# Patient Record
Sex: MALE | Race: WHITE | NOT HISPANIC OR LATINO | Employment: FULL TIME | URBAN - METROPOLITAN AREA
[De-identification: names, ages, dates, MRNs, and addresses within clinical notes are randomized per-mention and may not be internally consistent; named-entity substitution may affect disease eponyms.]

---

## 2019-03-17 ENCOUNTER — APPOINTMENT (OUTPATIENT)
Dept: CT IMAGING | Facility: HOSPITAL | Age: 49
End: 2019-03-17

## 2019-03-17 ENCOUNTER — HOSPITAL ENCOUNTER (EMERGENCY)
Facility: HOSPITAL | Age: 49
Discharge: HOME | End: 2019-03-17
Attending: EMERGENCY MEDICINE | Admitting: EMERGENCY MEDICINE

## 2019-03-17 VITALS
HEIGHT: 74 IN | SYSTOLIC BLOOD PRESSURE: 137 MMHG | BODY MASS INDEX: 21.17 KG/M2 | DIASTOLIC BLOOD PRESSURE: 93 MMHG | HEART RATE: 78 BPM | TEMPERATURE: 98.3 F | RESPIRATION RATE: 20 BRPM | WEIGHT: 165 LBS | OXYGEN SATURATION: 99 %

## 2019-03-17 DIAGNOSIS — N20.1 RIGHT URETERAL STONE: Primary | ICD-10-CM

## 2019-03-17 LAB
ALBUMIN SERPL-MCNC: 4.4 G/DL (ref 3.2–4.8)
ALBUMIN/GLOB SERPL: 2 G/DL (ref 1.5–2.5)
ALP SERPL-CCNC: 84 U/L (ref 25–100)
ALT SERPL W P-5'-P-CCNC: 42 U/L (ref 7–40)
ANION GAP SERPL CALCULATED.3IONS-SCNC: 9 MMOL/L (ref 3–11)
AST SERPL-CCNC: 26 U/L (ref 0–33)
BACTERIA UR QL AUTO: ABNORMAL /HPF
BASOPHILS # BLD AUTO: 0.02 10*3/MM3 (ref 0–0.2)
BASOPHILS NFR BLD AUTO: 0.3 % (ref 0–1)
BILIRUB SERPL-MCNC: 0.3 MG/DL (ref 0.3–1.2)
BILIRUB UR QL STRIP: NEGATIVE
BUN BLD-MCNC: 10 MG/DL (ref 9–23)
BUN/CREAT SERPL: 10.1 (ref 7–25)
CALCIUM SPEC-SCNC: 8.9 MG/DL (ref 8.7–10.4)
CHLORIDE SERPL-SCNC: 110 MMOL/L (ref 99–109)
CLARITY UR: ABNORMAL
CO2 SERPL-SCNC: 24 MMOL/L (ref 20–31)
COLOR UR: YELLOW
CREAT BLD-MCNC: 0.99 MG/DL (ref 0.6–1.3)
DEPRECATED RDW RBC AUTO: 40.8 FL (ref 37–54)
EOSINOPHIL # BLD AUTO: 0.17 10*3/MM3 (ref 0–0.3)
EOSINOPHIL NFR BLD AUTO: 2.3 % (ref 0–3)
ERYTHROCYTE [DISTWIDTH] IN BLOOD BY AUTOMATED COUNT: 13.2 % (ref 11.3–14.5)
GFR SERPL CREATININE-BSD FRML MDRD: 81 ML/MIN/1.73
GLOBULIN UR ELPH-MCNC: 2.2 GM/DL
GLUCOSE BLD-MCNC: 110 MG/DL (ref 70–100)
GLUCOSE UR STRIP-MCNC: NEGATIVE MG/DL
HCT VFR BLD AUTO: 44.5 % (ref 38.9–50.9)
HGB BLD-MCNC: 15.3 G/DL (ref 13.1–17.5)
HGB UR QL STRIP.AUTO: ABNORMAL
HOLD SPECIMEN: NORMAL
HOLD SPECIMEN: NORMAL
HYALINE CASTS UR QL AUTO: ABNORMAL /LPF
IMM GRANULOCYTES # BLD AUTO: 0.02 10*3/MM3 (ref 0–0.05)
IMM GRANULOCYTES NFR BLD AUTO: 0.3 % (ref 0–0.6)
KETONES UR QL STRIP: ABNORMAL
LEUKOCYTE ESTERASE UR QL STRIP.AUTO: NEGATIVE
LIPASE SERPL-CCNC: 61 U/L (ref 6–51)
LYMPHOCYTES # BLD AUTO: 2.6 10*3/MM3 (ref 0.6–4.8)
LYMPHOCYTES NFR BLD AUTO: 34.5 % (ref 24–44)
MCH RBC QN AUTO: 29.4 PG (ref 27–31)
MCHC RBC AUTO-ENTMCNC: 34.4 G/DL (ref 32–36)
MCV RBC AUTO: 85.6 FL (ref 80–99)
MONOCYTES # BLD AUTO: 0.51 10*3/MM3 (ref 0–1)
MONOCYTES NFR BLD AUTO: 6.8 % (ref 0–12)
NEUTROPHILS # BLD AUTO: 4.24 10*3/MM3 (ref 1.5–8.3)
NEUTROPHILS NFR BLD AUTO: 56.1 % (ref 41–71)
NITRITE UR QL STRIP: NEGATIVE
PH UR STRIP.AUTO: <=5 [PH] (ref 5–8)
PLATELET # BLD AUTO: 303 10*3/MM3 (ref 150–450)
PMV BLD AUTO: 10 FL (ref 6–12)
POTASSIUM BLD-SCNC: 3.7 MMOL/L (ref 3.5–5.5)
PROT SERPL-MCNC: 6.6 G/DL (ref 5.7–8.2)
PROT UR QL STRIP: NEGATIVE
RBC # BLD AUTO: 5.2 10*6/MM3 (ref 4.2–5.76)
RBC # UR: ABNORMAL /HPF
REF LAB TEST METHOD: ABNORMAL
SODIUM BLD-SCNC: 143 MMOL/L (ref 132–146)
SP GR UR STRIP: 1.02 (ref 1–1.03)
SQUAMOUS #/AREA URNS HPF: ABNORMAL /HPF
UROBILINOGEN UR QL STRIP: ABNORMAL
WBC NRBC COR # BLD: 7.54 10*3/MM3 (ref 3.5–10.8)
WBC UR QL AUTO: ABNORMAL /HPF
WHOLE BLOOD HOLD SPECIMEN: NORMAL
WHOLE BLOOD HOLD SPECIMEN: NORMAL

## 2019-03-17 PROCEDURE — 80053 COMPREHEN METABOLIC PANEL: CPT

## 2019-03-17 PROCEDURE — 74176 CT ABD & PELVIS W/O CONTRAST: CPT

## 2019-03-17 PROCEDURE — 25010000002 ONDANSETRON PER 1 MG: Performed by: EMERGENCY MEDICINE

## 2019-03-17 PROCEDURE — 83690 ASSAY OF LIPASE: CPT

## 2019-03-17 PROCEDURE — 25010000002 MORPHINE PER 10 MG: Performed by: EMERGENCY MEDICINE

## 2019-03-17 PROCEDURE — 96374 THER/PROPH/DIAG INJ IV PUSH: CPT

## 2019-03-17 PROCEDURE — 99284 EMERGENCY DEPT VISIT MOD MDM: CPT

## 2019-03-17 PROCEDURE — 85025 COMPLETE CBC W/AUTO DIFF WBC: CPT

## 2019-03-17 PROCEDURE — 96375 TX/PRO/DX INJ NEW DRUG ADDON: CPT

## 2019-03-17 PROCEDURE — 81001 URINALYSIS AUTO W/SCOPE: CPT

## 2019-03-17 RX ORDER — ONDANSETRON 2 MG/ML
4 INJECTION INTRAMUSCULAR; INTRAVENOUS ONCE
Status: COMPLETED | OUTPATIENT
Start: 2019-03-17 | End: 2019-03-17

## 2019-03-17 RX ORDER — DICLOFENAC SODIUM 75 MG/1
75 TABLET, DELAYED RELEASE ORAL 2 TIMES DAILY
Qty: 14 TABLET | Refills: 0 | Status: SHIPPED | OUTPATIENT
Start: 2019-03-17

## 2019-03-17 RX ORDER — MORPHINE SULFATE 4 MG/ML
4 INJECTION, SOLUTION INTRAMUSCULAR; INTRAVENOUS ONCE
Status: COMPLETED | OUTPATIENT
Start: 2019-03-17 | End: 2019-03-17

## 2019-03-17 RX ORDER — OXYCODONE AND ACETAMINOPHEN 7.5; 325 MG/1; MG/1
1 TABLET ORAL EVERY 6 HOURS PRN
Qty: 15 TABLET | Refills: 0 | Status: SHIPPED | OUTPATIENT
Start: 2019-03-17

## 2019-03-17 RX ORDER — OMEPRAZOLE 20 MG/1
20 CAPSULE, DELAYED RELEASE ORAL DAILY
COMMUNITY

## 2019-03-17 RX ORDER — SODIUM CHLORIDE 0.9 % (FLUSH) 0.9 %
10 SYRINGE (ML) INJECTION AS NEEDED
Status: DISCONTINUED | OUTPATIENT
Start: 2019-03-17 | End: 2019-03-17 | Stop reason: HOSPADM

## 2019-03-17 RX ORDER — ONDANSETRON 4 MG/1
4 TABLET, ORALLY DISINTEGRATING ORAL EVERY 6 HOURS PRN
Qty: 15 TABLET | Refills: 0 | Status: SHIPPED | OUTPATIENT
Start: 2019-03-17

## 2019-03-17 RX ADMIN — MORPHINE SULFATE 4 MG: 4 INJECTION INTRAVENOUS at 18:52

## 2019-03-17 RX ADMIN — SODIUM CHLORIDE 500 ML: 9 INJECTION, SOLUTION INTRAVENOUS at 20:05

## 2019-03-17 RX ADMIN — ONDANSETRON 4 MG: 2 INJECTION INTRAMUSCULAR; INTRAVENOUS at 18:52

## 2019-03-17 NOTE — ED PROVIDER NOTES
Subjective   Ms. Olivier Childress is a 48 y.o. male who presents to the ED with complaints of abdominal pain onset approximately one hour. Patient reports approximately one hour ago he had sudden onset of RLQ abdominal pain radiating to right flank with accompanied nausea and one vomiting episode. He reported to a [facility] following onset and during visit he was given Toradol IM and instructed to report to ED for further evaluation. Patient states the [facility] provider associated his symptoms with a kidney stone and he states his symptoms are somewhat similar to previous kidney stone episode. He also complains of one episode of acid reflux yesterday and difficulty urinating onset today. He denies dysuria, fever, diarrhea, chills, and any other acute symptoms at this time. Past medical history significant for GERD, traumatic brain injury, joint replacement, cholecystectomy, mandible surgery, back surgery.         History provided by:  Patient  Abdominal Pain   Pain location:  RLQ  Pain radiates to:  R flank  Pain severity:  Moderate  Onset quality:  Sudden  Duration:  1 hour  Timing:  Constant  Progression:  Unchanged  Chronicity:  New  Relieved by:  Nothing  Worsened by:  Nothing  Ineffective treatments: Toradol IM.  Associated symptoms: nausea and vomiting    Associated symptoms: no chills, no diarrhea, no dysuria and no fever        Review of Systems   Constitutional: Negative for chills and fever.   Gastrointestinal: Positive for abdominal pain, nausea and vomiting. Negative for diarrhea.   Genitourinary: Positive for difficulty urinating and flank pain (right). Negative for dysuria.   All other systems reviewed and are negative.      Past Medical History:   Diagnosis Date   • GERD (gastroesophageal reflux disease)    • Traumatic brain injury (CMS/HCC)        No Known Allergies    Past Surgical History:   Procedure Laterality Date   • BACK SURGERY     • CHOLECYSTECTOMY     • JOINT REPLACEMENT     • MANDIBLE SURGERY          History reviewed. No pertinent family history.    Social History     Socioeconomic History   • Marital status:      Spouse name: Not on file   • Number of children: Not on file   • Years of education: Not on file   • Highest education level: Not on file   Tobacco Use   • Smoking status: Never Smoker   Substance and Sexual Activity   • Alcohol use: No     Frequency: Never   • Drug use: No   • Sexual activity: Defer         Objective   Physical Exam   Constitutional: He is oriented to person, place, and time. He appears well-developed and well-nourished. No distress.   HENT:   Head: Normocephalic and atraumatic.   Right Ear: External ear normal.   Left Ear: External ear normal.   Nose: Nose normal.   Eyes: Conjunctivae are normal. No scleral icterus.   Neck: Normal range of motion. Neck supple.   Cardiovascular: Normal rate, regular rhythm and normal heart sounds. Exam reveals no gallop and no friction rub.   No murmur heard.  Pulmonary/Chest: Effort normal and breath sounds normal. No respiratory distress. He has no wheezes. He has no rales. He exhibits no tenderness.   Abdominal: Soft. Bowel sounds are normal. There is no tenderness. There is no rebound and no guarding.   Musculoskeletal: Normal range of motion.   Neurological: He is alert and oriented to person, place, and time.   Skin: Skin is warm and dry. He is not diaphoretic.   Psychiatric: He has a normal mood and affect. His behavior is normal.   Nursing note and vitals reviewed.      Procedures         ED Course     Symptoms resolved with meds.  Labs and UA benign.  CT shows 3 mm R ureteral stone.  Patient stable on serial rechecks.  Discussed findings, concerns, plan of care, expected course, reasons to return and followup.                  MDM  Number of Diagnoses or Management Options  Right ureteral stone:      Amount and/or Complexity of Data Reviewed  Clinical lab tests: reviewed and ordered  Tests in the radiology section of CPT®:  reviewed and ordered  Independent visualization of images, tracings, or specimens: yes        Final diagnoses:   Right ureteral stone       Documentation assistance provided by scribRossana Alvarez.  Information recorded by the scribe was done at my direction and has been verified and validated by me.     Elieser Alvarez  03/17/19 [id number]       Balbir Hardin MD  03/18/19 [id number]

## 2019-10-15 ENCOUNTER — APPOINTMENT (OUTPATIENT)
Dept: CT IMAGING | Facility: HOSPITAL | Age: 49
End: 2019-10-15

## 2019-10-15 ENCOUNTER — HOSPITAL ENCOUNTER (EMERGENCY)
Facility: HOSPITAL | Age: 49
Discharge: HOME | End: 2019-10-15
Attending: EMERGENCY MEDICINE | Admitting: EMERGENCY MEDICINE

## 2019-10-15 VITALS
SYSTOLIC BLOOD PRESSURE: 117 MMHG | BODY MASS INDEX: 34.14 KG/M2 | DIASTOLIC BLOOD PRESSURE: 78 MMHG | RESPIRATION RATE: 18 BRPM | HEART RATE: 64 BPM | OXYGEN SATURATION: 100 % | TEMPERATURE: 98.2 F | HEIGHT: 74 IN | WEIGHT: 266 LBS

## 2019-10-15 DIAGNOSIS — M54.50 CHRONIC LOW BACK PAIN, UNSPECIFIED BACK PAIN LATERALITY, UNSPECIFIED WHETHER SCIATICA PRESENT: ICD-10-CM

## 2019-10-15 DIAGNOSIS — R51.9 HEADACHE, UNSPECIFIED HEADACHE TYPE: Primary | ICD-10-CM

## 2019-10-15 DIAGNOSIS — M54.50 ACUTE LOW BACK PAIN, UNSPECIFIED BACK PAIN LATERALITY, UNSPECIFIED WHETHER SCIATICA PRESENT: ICD-10-CM

## 2019-10-15 DIAGNOSIS — G89.29 CHRONIC LOW BACK PAIN, UNSPECIFIED BACK PAIN LATERALITY, UNSPECIFIED WHETHER SCIATICA PRESENT: ICD-10-CM

## 2019-10-15 PROCEDURE — 25010000002 KETOROLAC TROMETHAMINE PER 15 MG: Performed by: EMERGENCY MEDICINE

## 2019-10-15 PROCEDURE — 25010000002 METOCLOPRAMIDE PER 10 MG: Performed by: EMERGENCY MEDICINE

## 2019-10-15 PROCEDURE — 99283 EMERGENCY DEPT VISIT LOW MDM: CPT

## 2019-10-15 PROCEDURE — 70450 CT HEAD/BRAIN W/O DYE: CPT

## 2019-10-15 PROCEDURE — 96374 THER/PROPH/DIAG INJ IV PUSH: CPT

## 2019-10-15 PROCEDURE — 63710000001 DIPHENHYDRAMINE PER 50 MG: Performed by: EMERGENCY MEDICINE

## 2019-10-15 PROCEDURE — 96375 TX/PRO/DX INJ NEW DRUG ADDON: CPT

## 2019-10-15 RX ORDER — DIPHENHYDRAMINE HCL 50 MG
50 CAPSULE ORAL ONCE
Status: COMPLETED | OUTPATIENT
Start: 2019-10-15 | End: 2019-10-15

## 2019-10-15 RX ORDER — SODIUM CHLORIDE 0.9 % (FLUSH) 0.9 %
10 SYRINGE (ML) INJECTION AS NEEDED
Status: DISCONTINUED | OUTPATIENT
Start: 2019-10-15 | End: 2019-10-15 | Stop reason: HOSPADM

## 2019-10-15 RX ORDER — KETOROLAC TROMETHAMINE 15 MG/ML
15 INJECTION, SOLUTION INTRAMUSCULAR; INTRAVENOUS ONCE
Status: COMPLETED | OUTPATIENT
Start: 2019-10-15 | End: 2019-10-15

## 2019-10-15 RX ORDER — METOCLOPRAMIDE HYDROCHLORIDE 5 MG/ML
10 INJECTION INTRAMUSCULAR; INTRAVENOUS ONCE
Status: COMPLETED | OUTPATIENT
Start: 2019-10-15 | End: 2019-10-15

## 2019-10-15 RX ORDER — NAPROXEN 500 MG/1
500 TABLET ORAL 2 TIMES DAILY PRN
Qty: 20 TABLET | Refills: 0 | Status: SHIPPED | OUTPATIENT
Start: 2019-10-15

## 2019-10-15 RX ADMIN — KETOROLAC TROMETHAMINE 15 MG: 15 INJECTION, SOLUTION INTRAMUSCULAR; INTRAVENOUS at 11:16

## 2019-10-15 RX ADMIN — SODIUM CHLORIDE 1000 ML: 9 INJECTION, SOLUTION INTRAVENOUS at 11:15

## 2019-10-15 RX ADMIN — DIPHENHYDRAMINE HYDROCHLORIDE 50 MG: 50 CAPSULE ORAL at 11:19

## 2019-10-15 RX ADMIN — METOCLOPRAMIDE 10 MG: 5 INJECTION, SOLUTION INTRAMUSCULAR; INTRAVENOUS at 11:17

## 2019-10-15 NOTE — ED PROVIDER NOTES
"Subjective   49-year-old male presents complaining of headache and atraumatic low back pain.  He states that he suffers from chronic headaches as well as chronic low back pain.  Proximally 5 days ago he began experiencing a dull, nagging \"migraine\" that has bothered him since that time.  The headache seems to be refractory to his over-the-counter home medications.  He is unsure as to what may have triggered his current exacerbation in symptoms.  He denies any lower extremity paresthesias.  No bowel or bladder incontinence or retention.  No IV drug use.  No difficulty walking.  Headache is currently 8/10 in severity with no aggravating or alleviating factors.  He endorses recent \"cold like symptoms.\"  No thunderclap onset.  No neck pain or stiffness.  No recent head trauma or injury.        History provided by:  Patient  Back Pain   Location:  Generalized  Radiates to:  Does not radiate  Pain severity:  Moderate  Onset quality:  Sudden  Duration:  5 days  Timing:  Constant  Progression:  Unchanged  Chronicity:  New  Context: not occupational injury    Relieved by:  None tried  Ineffective treatments:  None tried  Associated symptoms: headaches    Associated symptoms: no bladder incontinence, no bowel incontinence and no fever    Risk factors: obesity        Review of Systems   Constitutional: Negative for fever.   HENT: Positive for rhinorrhea.    Respiratory: Positive for cough.    Gastrointestinal: Negative for bowel incontinence.   Genitourinary: Negative for bladder incontinence.   Musculoskeletal: Positive for back pain.   Neurological: Positive for headaches.   All other systems reviewed and are negative.      Past Medical History:   Diagnosis Date   • GERD (gastroesophageal reflux disease)    • Traumatic brain injury (CMS/HCC)        No Known Allergies    Past Surgical History:   Procedure Laterality Date   • BACK SURGERY     • CHOLECYSTECTOMY     • JOINT REPLACEMENT     • MANDIBLE SURGERY         History " reviewed. No pertinent family history.    Social History     Socioeconomic History   • Marital status:      Spouse name: Not on file   • Number of children: Not on file   • Years of education: Not on file   • Highest education level: Not on file   Tobacco Use   • Smoking status: Never Smoker   Substance and Sexual Activity   • Alcohol use: No     Frequency: Never   • Drug use: No   • Sexual activity: Defer         Objective   Physical Exam   Constitutional: He is oriented to person, place, and time. He appears well-developed and well-nourished. No distress.   Well-appearing male in no acute distress   HENT:   Head: Normocephalic and atraumatic.   Mouth/Throat: Oropharynx is clear and moist.   Eyes: EOM are normal. Pupils are equal, round, and reactive to light.   Neck: Normal range of motion. No JVD present.   No meningeal signs or nuchal rigidity   Cardiovascular: Normal rate, regular rhythm, normal heart sounds and intact distal pulses. Exam reveals no gallop and no friction rub.   No murmur heard.  Pulmonary/Chest: Effort normal and breath sounds normal. No respiratory distress. He has no wheezes. He has no rales.   Musculoskeletal: Normal range of motion.   No midline lumbar spine tenderness to palpation, no step-offs or deformities noted   Neurological: He is alert and oriented to person, place, and time. He displays normal reflexes. No cranial nerve deficit or sensory deficit. Coordination normal.   No saddle anesthesia, normal gait   Skin: Skin is warm and dry. No rash noted. He is not diaphoretic. No erythema. No pallor.   Psychiatric: He has a normal mood and affect. Judgment and thought content normal.   Nursing note and vitals reviewed.      Procedures         ED Course  ED Course as of Oct 15 1554   Tue Oct 15, 2019   1040 49-year-old male presents for evaluation of acute on chronic headache and low back pain for the past 5 days.  On arrival to the ED, patient nontoxic-appearing.  No red flag low  "back signs, symptoms, or history necessitating emergent neuroimaging.  No thunderclap onset.  No meningeal signs or nuchal rigidity.  Vital signs reassuring.  Given the duration of the patient's headache, we will obtain neuro imaging to rule out mass and will reassess following IV fluids and medications.  [DD]   1234 CT head negative.  [DD]   1236 Upon reevaluation, patient significantly improved.  Reassured and counseled regarding symptomatic management.  Prescription for NSAIDs as needed.  He will follow-up with his primary care physician within the next week.  Agreeable with plan and given appropriate strict return precautions.  [DD]      ED Course User Index  [DD] Santana Velazco MD      No results found for this or any previous visit (from the past 24 hour(s)).  Note: In addition to lab results from this visit, the labs listed above may include labs taken at another facility or during a different encounter within the last 24 hours. Please correlate lab times with ED admission and discharge times for further clarification of the services performed during this visit.    No orders to display     Vitals:    10/15/19 0951 10/15/19 0953   BP:  136/86   BP Location:  Left arm   Patient Position:  Lying   Pulse:  91   Resp:  18   Temp: 98.2 °F (36.8 °C)    TempSrc: Oral    SpO2:  96%   Weight: 121 kg (266 lb)    Height: 188 cm (74\")      Medications - No data to display  ECG/EMG Results (last 24 hours)     ** No results found for the last 24 hours. **        No orders to display                     No results found for this or any previous visit (from the past 24 hour(s)).  Note: In addition to lab results from this visit, the labs listed above may include labs taken at another facility or during a different encounter within the last 24 hours. Please correlate lab times with ED admission and discharge times for further clarification of the services performed during this visit.    CT Head Without Contrast "   Preliminary Result   No acute intracranial process.       D:  10/15/2019   E:  10/15/2019                Vitals:    10/15/19 0953 10/15/19 1030 10/15/19 1140 10/15/19 1245   BP: 136/86 117/78     BP Location: Left arm      Patient Position: Lying      Pulse: 91 64 68 64   Resp: 18      Temp:       TempSrc:       SpO2: 96% 97% 97% 100%   Weight:       Height:         Medications   ketorolac (TORADOL) injection 15 mg (15 mg Intravenous Given 10/15/19 1116)   metoclopramide (REGLAN) injection 10 mg (10 mg Intravenous Given 10/15/19 1117)   diphenhydrAMINE (BENADRYL) capsule 50 mg (50 mg Oral Given 10/15/19 1119)   sodium chloride 0.9 % bolus 1,000 mL (0 mL Intravenous Stopped 10/15/19 1317)     ECG/EMG Results (last 24 hours)     ** No results found for the last 24 hours. **        No orders to display           MDM    Final diagnoses:   Headache, unspecified headache type   Acute low back pain, unspecified back pain laterality, unspecified whether sciatica present   Chronic low back pain, unspecified back pain laterality, unspecified whether sciatica present       Documentation assistance provided by daniel Guevara.  Information recorded by the scribe was done at my direction and has been verified and validated by me.     Usman Guevara  10/15/19 1020       Santana Velazco MD  10/15/19 0464

## 2019-10-15 NOTE — DISCHARGE INSTRUCTIONS
Follow up with your primary care provider, Jaimie LOCK, in one week.    Return to the emergency department if you experience any new or worsening symptoms.

## 2021-03-10 ENCOUNTER — IMMUNIZATION (OUTPATIENT)
Dept: VACCINE CLINIC | Facility: HOSPITAL | Age: 51
End: 2021-03-10

## 2021-03-10 PROCEDURE — 0001A: CPT | Performed by: INTERNAL MEDICINE

## 2021-03-10 PROCEDURE — 91300 HC SARSCOV02 VAC 30MCG/0.3ML IM: CPT | Performed by: INTERNAL MEDICINE

## 2021-04-06 ENCOUNTER — IMMUNIZATION (OUTPATIENT)
Dept: VACCINE CLINIC | Facility: HOSPITAL | Age: 51
End: 2021-04-06

## 2021-04-06 PROCEDURE — 91300 HC SARSCOV02 VAC 30MCG/0.3ML IM: CPT | Performed by: INTERNAL MEDICINE

## 2021-04-06 PROCEDURE — 0002A: CPT | Performed by: INTERNAL MEDICINE
